# Patient Record
(demographics unavailable — no encounter records)

---

## 2025-02-24 NOTE — HISTORY OF PRESENT ILLNESS
[Pain Location] : pain [] : left thumb [de-identified] : Pt is a 19 y/o male with left thumb fracture.  He was playing lacrosse at [facility] on 2/28/25 and he was slashed on the hand.  He had pain immediately.  He had xrays on 2/20/25 which revealed a left thumb base of proximal phalanx ulnar avulsion fracture.  A splint was applied and he was advised to follow up with a specialist.  Pt goes to [facility] in [facility] and plays lacrosse. Injury was on Tuesday 2/18/2025 and x ray was on Thursday 2/20/2025. Pt has left thumb injury. Pt brings imaging in office today. Pt is accompanied by his mother, and asks if pt can continue to play with a brace. Pt is left handed for lacrosse, but writes with his right hand.

## 2025-02-24 NOTE — END OF VISIT
[FreeTextEntry3] : I, Jaki Donahue, acted as a scribe on behalf of Dr. Mitesh Wilks M.D., on 02/24/2025.  All medical entries made by the scribe were at my, Dr. Mitesh Wilks M.D., direction and personally dictated by me on 02/24/2025. I have reviewed the chart and agree that the record accurately reflects my personal performance of the history, physical exam, assessment and plan. I have also personally directed, reviewed, and agreed with the chart.

## 2025-02-24 NOTE — DISCUSSION/SUMMARY
[Surgical risks reviewed] : Surgical risks reviewed [de-identified] : 19 yo male presents in office for left thumb fracture.  Plan: Recommended surgical management with patient in order to minimize risk of instability and post traumatic arthritis; Patient and his mom prefer not to have surgery at this time so that he can continue playing Lacrosse. Left short arm thumb spica splint applied in office today. Provide clearance to play with a brace.